# Patient Record
Sex: FEMALE | Race: WHITE
[De-identification: names, ages, dates, MRNs, and addresses within clinical notes are randomized per-mention and may not be internally consistent; named-entity substitution may affect disease eponyms.]

---

## 2020-08-08 ENCOUNTER — HOSPITAL ENCOUNTER (EMERGENCY)
Dept: HOSPITAL 41 - JD.ED | Age: 20
Discharge: HOME | End: 2020-08-08
Payer: MEDICAID

## 2020-08-08 DIAGNOSIS — J45.901: Primary | ICD-10-CM

## 2020-08-08 DIAGNOSIS — Z79.899: ICD-10-CM
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
